# Patient Record
Sex: MALE | Race: BLACK OR AFRICAN AMERICAN | URBAN - METROPOLITAN AREA
[De-identification: names, ages, dates, MRNs, and addresses within clinical notes are randomized per-mention and may not be internally consistent; named-entity substitution may affect disease eponyms.]

---

## 2017-01-06 ENCOUNTER — OFFICE VISIT (OUTPATIENT)
Dept: INTERNAL MEDICINE CLINIC | Facility: CLINIC | Age: 52
End: 2017-01-06

## 2017-01-06 VITALS
HEART RATE: 68 BPM | WEIGHT: 202.5 LBS | SYSTOLIC BLOOD PRESSURE: 118 MMHG | HEIGHT: 70 IN | BODY MASS INDEX: 28.99 KG/M2 | TEMPERATURE: 97.4 F | DIASTOLIC BLOOD PRESSURE: 78 MMHG | RESPIRATION RATE: 18 BRPM

## 2017-01-06 DIAGNOSIS — G47.33 OBSTRUCTIVE SLEEP APNEA SYNDROME: ICD-10-CM

## 2017-01-06 DIAGNOSIS — G43.109 MIGRAINE WITH AURA AND WITHOUT STATUS MIGRAINOSUS, NOT INTRACTABLE: Primary | ICD-10-CM

## 2017-01-06 DIAGNOSIS — Z23 ENCOUNTER FOR IMMUNIZATION: ICD-10-CM

## 2017-01-06 DIAGNOSIS — J30.2 SEASONAL ALLERGIC RHINITIS, UNSPECIFIED ALLERGIC RHINITIS TRIGGER: ICD-10-CM

## 2017-01-06 PROBLEM — H02.401 PTOSIS, RIGHT EYELID: Status: ACTIVE | Noted: 2017-01-06

## 2017-01-06 RX ORDER — FLUTICASONE PROPIONATE 50 MCG
2 SPRAY, SUSPENSION (ML) NASAL DAILY
COMMUNITY

## 2017-01-06 RX ORDER — CETIRIZINE HCL 10 MG
TABLET ORAL
COMMUNITY

## 2017-01-06 RX ORDER — SUMATRIPTAN 100 MG/1
TABLET, FILM COATED ORAL
Qty: 12 TAB | Refills: 5 | Status: SHIPPED | OUTPATIENT
Start: 2017-01-06 | End: 2017-11-07 | Stop reason: SDUPTHER
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
## 2017-01-06 NOTE — PROGRESS NOTES
Chief Complaint   Patient presents with   [facility]    Headache     Judy Lopez  is a 54 y.o.  male  who present for Flu immunizations/injections. He/she denies any symptoms , reactions or allergies that would exclude them from being immunized today. Risks and adverse reactions were discussed and the VIS was given if applicable to them. All questions were addressed. He/She was observed for 5 min post injection. There were no reactions observed.     Alpa Bustillos LPN

## 2017-01-06 NOTE — MR AVS SNAPSHOT
Visit Information Date & Time Provider Department Dept. Phone Encounter #  
 1/6/2017  9:30 AM Chelsea Huffman PA-C [facility] Internal Medicine [phone] [id number] Follow-up Instructions Return in about 6 months (around 7/6/2017) for Migraine follow up. 4-6 weeks if no improvement. Domi Evans Upcoming Health Maintenance Date Due DTaP/Tdap/Td series (1 - Tdap) 4/26/1988 INFLUENZA AGE 9 TO ADULT 8/1/2016 Allergies as of 1/6/2017  Review Complete On: 1/6/2017 By: Chelsea Huffman PA-C Severity Noted Reaction Type Reactions Sulfa (Sulfonamide Antibiotics) Medium 01/26/2012   Side Effect Palpitations Passes out and also have sweating Current Immunizations  Never Reviewed No immunizations on file. Not reviewed this visit You Were Diagnosed With   
  
 Codes Comments Migraine with aura and without status migrainosus, not intractable    -  Primary ICD-10-CM: G43.109 ICD-9-CM: 346.00 Vitals BP Pulse Temp Resp Height(growth percentile) Weight(growth percentile) 118/78 68 97.4 °F (36.3 °C) (Oral) 18 5' 10\" (1.778 m) 202 lb 8 oz (91.9 kg) BMI Smoking Status 29.06 kg/m2 Never Smoker Vitals History BMI and BSA Data Body Mass Index Body Surface Area  
 29.06 kg/m 2 2.13 m 2 Preferred Pharmacy Pharmacy Name Phone [id number] [place], [address] [phone] Your Updated Medication List  
  
   
This list is accurate as of: 1/6/17 10:23 AM.  Always use your most recent med list.  
  
  
  
  
 MOTRIN 800 mg tablet Generic drug:  ibuprofen Take  by mouth every six (6) hours as needed. SUMAtriptan 100 mg tablet Commonly known as:  IMITREX Take once at migraine onset and once 2 hours later if not symptom-free. Prescriptions Printed Refills  SUMAtriptan (IMITREX) 100 mg tablet 5  
 Sig: Take once at migraine onset and once 2 hours later if not symptom-free. Class: Print Follow-up Instructions Return in about 6 months (around 7/6/2017) for Migraine follow up. 4-6 weeks if no improvement. .  
  
  
Introducing [facility] HEALTH SERVICES! [facility] introduces [employer] patient portal. Now you can access parts of your medical record, email your doctor's office, and request medication refills online. 1. In your internet browser, go to [email or web address] 2. Click on the First Time User? Click Here link in the Sign In box. You will see the New Member Sign Up page. 3. Enter your [employer] Access Code exactly as it appears below. You will not need to use this code after youve completed the sign-up process. If you do not sign up before the expiration date, you must request a new code. · [employer] Access Code: [email or web address] Expires: 4/6/2017  9:18 AM 
 
4. Enter the last four digits of your Social Security Number (xxxx) and Date of Birth (mm/dd/yyyy) as indicated and click Submit. You will be taken to the next sign-up page. 5. Create a [employer] ID. This will be your [employer] login ID and cannot be changed, so think of one that is secure and easy to remember. 6. Create a [employer] password. You can change your password at any time. 7. Enter your Password Reset Question and Answer. This can be used at a later time if you forget your password. 8. Enter your e-mail address. You will receive e-mail notification when new information is available in [address] 9. Click Sign Up. You can now view and download portions of your medical record. 10. Click the Download Summary menu link to download a portable copy of your medical information. If you have questions, please visit the Frequently Asked Questions section of the Ind[employer] website. Remember, [employer] is NOT to be used for urgent needs. For medical emergencies, dial 911. Now available from your iPhone and Android! Please provide this summary of care documentation to your next provider. If you have any questions after today's visit, please call [phone].

## 2017-02-02 ENCOUNTER — OFFICE VISIT (OUTPATIENT)
Dept: INTERNAL MEDICINE CLINIC | Facility: CLINIC | Age: 52
End: 2017-02-02

## 2017-02-02 VITALS
SYSTOLIC BLOOD PRESSURE: 132 MMHG | BODY MASS INDEX: 28.49 KG/M2 | OXYGEN SATURATION: 95 % | WEIGHT: 199 LBS | HEART RATE: 97 BPM | HEIGHT: 70 IN | TEMPERATURE: 97.2 F | RESPIRATION RATE: 18 BRPM | DIASTOLIC BLOOD PRESSURE: 86 MMHG

## 2017-02-02 DIAGNOSIS — Z23 ENCOUNTER FOR IMMUNIZATION: ICD-10-CM

## 2017-02-02 DIAGNOSIS — G43.109 MIGRAINE WITH AURA AND WITHOUT STATUS MIGRAINOSUS, NOT INTRACTABLE: Primary | ICD-10-CM

## 2017-02-02 PROBLEM — Z87.828 HISTORY OF EYE TRAUMA: Status: ACTIVE | Noted: 2017-02-02

## 2017-02-02 NOTE — MR AVS SNAPSHOT
Visit Information Date & Time Provider Department Dept. Phone Encounter #  
 2/2/2017  2:00 PM Amanda Liang PA-C [facility] Internal Medicine [phone] [id number] Follow-up Instructions Return in about 6 months (around 8/2/2017) for Migraine follow up. Upcoming Health Maintenance Date Due DTaP/Tdap/Td series (1 - Tdap) 4/26/1988 Allergies as of 2/2/2017  Review Complete On: 2/2/2017 By: Amanda Liang PA-C Severity Noted Reaction Type Reactions Sulfa (Sulfonamide Antibiotics) Medium 01/26/2012   Side Effect Palpitations Passes out and also have sweating Current Immunizations  Never Reviewed Name Date Influenza Vaccine (Quad) PF 1/6/2017 Tdap  Incomplete Not reviewed this visit You Were Diagnosed With   
  
 Codes Comments Migraine with aura and without status migrainosus, not intractable    -  Primary ICD-10-CM: G43.109 ICD-9-CM: 346.00 Encounter for immunization     ICD-10-CM: K69 ICD-9-CM: V03.89 Vitals BP Pulse Temp Resp Height(growth percentile) Weight(growth percentile) 132/86 (BP 1 Location: Left arm, BP Patient Position: Sitting) 97 97.2 °F (36.2 °C) (Oral) 18 5' 10\" (1.778 m) 199 lb (90.3 kg) SpO2 BMI Smoking Status 95% 28.55 kg/m2 Never Smoker Vitals History BMI and BSA Data Body Mass Index Body Surface Area 28.55 kg/m 2 2.11 m 2 Preferred Pharmacy Pharmacy Name Phone [id number] [place], [address] [phone] Your Updated Medication List  
  
   
This list is accurate as of: 2/2/17  2:40 PM.  Always use your most recent med list.  
  
  
  
  
 cetirizine 10 mg tablet Commonly known as:  ZYRTEC Take  by mouth. FLONASE 50 mcg/actuation nasal spray Generic drug:  fluticasone 2 Sprays by Both Nostrils route daily. SUMAtriptan 100 mg tablet Commonly known as:  IMITREX Take once at migraine onset and once 2 hours later if not symptom-free. We Performed the Following TETANUS, DIPHTHERIA TOXOIDS AND ACELLULAR PERTUSSIS VACCINE (TDAP), IN INDIVIDS. >=7, IM [id number] CPT(R)] Follow-up Instructions Return in about 6 months (around 8/2/2017) for Migraine follow up. Introducing [facility] & HEALTH SERVICES! [facility] introduces [employer] patient portal. Now you can access parts of your medical record, email your doctor's office, and request medication refills online. 1. In your internet browser, go to [email or web address] 2. Click on the First Time User? Click Here link in the Sign In box. You will see the New Member Sign Up page. 3. Enter your Seg[employer] Access Code exactly as it appears below. You will not need to use this code after youve completed the sign-up process. If you do not sign up before the expiration date, you must request a new code. · [employer] Access Code: [email or web address] Expires: 4/6/2017  9:18 AM 
 
4. Enter the last four digits of your Social Security Number (xxxx) and Date of Birth (mm/dd/yyyy) as indicated and click Submit. You will be taken to the next sign-up page. 5. Create a Seguricel ID. This will be your [employer] login ID and cannot be changed, so think of one that is secure and easy to remember. 6. Create a Seguricel password. You can change your password at any time. 7. Enter your Password Reset Question and Answer. This can be used at a later time if you forget your password. 8. Enter your e-mail address. You will receive e-mail notification when new information is available in [address] 9. Click Sign Up. You can now view and download portions of your medical record. 10. Click the Download Summary menu link to download a portable copy of your medical information.  
 
If you have questions, please visit the Frequently Asked Questions section of the Perfecto Mobile. Remember, [employer] is NOT to be used for urgent needs. For medical emergencies, dial 911. Now available from your iPhone and Android! Please provide this summary of care documentation to your next provider. If you have any questions after today's visit, please call [phone].

## 2017-02-02 NOTE — PROGRESS NOTES
Room 7    Chief Complaint   Patient presents with   [address] [facility] documentation     1. Have you been to the ER, urgent care clinic since your last visit? Hospitalized since your last visit? No    2. Have you seen or consulted any other health care providers outside of the [address] [place] [address] since your last visit? Include any pap smears or colon screening. No    Health Maintenance Due   Topic Date Due    DTaP/Tdap/Td series (1 - Tdap) 04/26/1988     Advance directives reviewed.  Patient received information previously

## 2017-02-02 NOTE — PROGRESS NOTES
HISTORY OF PRESENT ILLNESS  Preeti Bearden is a 54 y.o. male. HPI  1) Migraine Headaches s/p R eye trauma 2004. Tried Imitrex for the first time yesterday - didn't have to take a second dose. The first dose made pt feel sleepy, he took a nap, and awoke without a headache! [facility]. Review of Systems   Constitutional: Negative for fever. HENT: Negative for hearing loss. Eyes: Positive for blurred vision and photophobia. Neurological: Positive for headaches. Physical Exam   Constitutional: He is oriented to person, place, and time. He appears well-developed and well-nourished. No distress. HENT:   Head: Normocephalic and atraumatic. Neck: Neck supple. No JVD present. Cardiovascular: Normal rate, regular rhythm and normal heart sounds. Pulmonary/Chest: Effort normal and breath sounds normal. No respiratory distress. Musculoskeletal: He exhibits no edema. Neurological: He is alert and oriented to person, place, and time. Skin: Skin is warm and dry. Psychiatric: He has a normal mood and affect. His behavior is normal. Judgment and thought content normal.   Nursing note and vitals reviewed. ASSESSMENT and PLAN    ICD-10-CM ICD-9-CM    1. Migraine with aura and without status migrainosus, not intractable G43.109 346.00 Continue Imitrex PRN. [facility] completed [facility]. Original given to patient. Copy made for our records.     2. Encounter for immunization Z23 V03.89 TETANUS, DIPHTHERIA TOXOIDS AND ACELLULAR PERTUSSIS VACCINE (TDAP), IN INDIVIDS. >=7, IM

## 2017-02-07 ENCOUNTER — DOCUMENTATION ONLY (OUTPATIENT)
Dept: INTERNAL MEDICINE CLINIC | Facility: CLINIC | Age: 52
End: 2017-02-07

## 2017-03-03 PROBLEM — H40.10X0 OPEN-ANGLE GLAUCOMA: Status: ACTIVE | Noted: 2017-03-03

## 2017-04-06 ENCOUNTER — OFFICE VISIT (OUTPATIENT)
Dept: INTERNAL MEDICINE CLINIC | Facility: CLINIC | Age: 52
End: 2017-04-06

## 2017-04-06 VITALS
RESPIRATION RATE: 18 BRPM | WEIGHT: 207 LBS | TEMPERATURE: 98.3 F | HEIGHT: 70 IN | BODY MASS INDEX: 29.63 KG/M2 | SYSTOLIC BLOOD PRESSURE: 108 MMHG | OXYGEN SATURATION: 96 % | DIASTOLIC BLOOD PRESSURE: 68 MMHG | HEART RATE: 86 BPM

## 2017-04-06 DIAGNOSIS — H00.012 HORDEOLUM EXTERNUM OF RIGHT LOWER EYELID: Primary | ICD-10-CM

## 2017-04-06 RX ORDER — BACITRACIN ZINC 500 UNIT/G
OINTMENT (GRAM) TOPICAL
Qty: 28 G | Refills: 0
Start: 2017-04-06 | End: 2017-04-13

## 2017-04-06 RX ORDER — ERYTHROMYCIN 5 MG/G
0.1 OINTMENT OPHTHALMIC EVERY 6 HOURS
Qty: 3.5 G | Refills: 1 | Status: SHIPPED | OUTPATIENT
Start: 2017-04-06 | End: 2017-04-06 | Stop reason: RX
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
## 2017-04-06 NOTE — MR AVS SNAPSHOT
Visit Information Date & Time Provider Department Dept. Phone Encounter #  
 4/6/2017  2:45 PM Pedro Quick PA-C [facility] Internal Medicine [phone] [id number] Follow-up Instructions Return if symptoms worsen or fail to improve. Upcoming Health Maintenance Date Due DTaP/Tdap/Td series (3 - Td) 2/2/2027 Allergies as of 4/6/2017  Review Complete On: 4/6/2017 By: Eleonora Jeffries LPN Severity Noted Reaction Type Reactions Sulfa (Sulfonamide Antibiotics) Medium 01/26/2012   Side Effect Palpitations Passes out and also have sweating Current Immunizations  Reviewed on 2/7/2017 Name Date Hep B Vaccine 6/8/2009, 2/6/2009 Influenza Vaccine (Quad) PF 1/6/2017 Novel Influenza-H1N1-09, Injectable 11/17/2009 Td 1/1/2001 Tdap 2/2/2017, 2/12/2014 Not reviewed this visit You Were Diagnosed With   
  
 Codes Comments Hordeolum externum of right lower eyelid    -  Primary ICD-10-CM: H00.012 ICD-9-CM: 373.11 Vitals BP Pulse Temp Resp Height(growth percentile) Weight(growth percentile) (!) 134/91 (BP 1 Location: Left arm, BP Patient Position: Sitting) 86 98.3 °F (36.8 °C) (Oral) 18 5' 10\" (1.778 m) 207 lb (93.9 kg) SpO2 BMI Smoking Status 96% 29.7 kg/m2 Never Smoker Vitals History BMI and BSA Data Body Mass Index Body Surface Area  
 29.7 kg/m 2 2.15 m 2 Preferred Pharmacy Pharmacy Name Phone [id number] [place], [address] [phone] Your Updated Medication List  
  
   
This list is accurate as of: 4/6/17  3:19 PM.  Always use your most recent med list.  
  
  
  
  
 cetirizine 10 mg tablet Commonly known as:  ZYRTEC Take  by mouth. erythromycin ophthalmic ointment Commonly known as:  ILOTYCIN Administer 0.1 g to both eyes every six (6) hours. FLONASE 50 mcg/actuation nasal spray Generic drug:  fluticasone 2 Sprays by Both Nostrils route daily. SUMAtriptan 100 mg tablet Commonly known as:  IMITREX Take once at migraine onset and once 2 hours later if not symptom-free. Prescriptions Sent to Pharmacy Refills  
 erythromycin (ILOTYCIN) ophthalmic ointment 1 Sig: Administer 0.1 g to both eyes every six (6) hours. Class: Normal  
 Pharmacy: [facility], [address]  #: [phone] Route: Both Eyes Follow-up Instructions Return if symptoms worsen or fail to improve. Introducing [facility] & HEALTH SERVICES! [facility] introduces [employer] patient portal. Now you can access parts of your medical record, email your doctor's office, and request medication refills online. 1. In your internet browser, go to [email or web address] 2. Click on the First Time User? Click Here link in the Sign In box. You will see the New Member Sign Up page. 3. Enter your [employer] Access Code exactly as it appears below. You will not need to use this code after youve completed the sign-up process. If you do not sign up before the expiration date, you must request a new code. · [employer] Access Code: [email or web address] Expires: 7/5/2017  3:18 PM 
 
4. Enter the last four digits of your Social Security Number (xxxx) and Date of Birth (mm/dd/yyyy) as indicated and click Submit. You will be taken to the next sign-up page. 5. Create a Twitt2got ID. This will be your [employer] login ID and cannot be changed, so think of one that is secure and easy to remember. 6. Create a Twitt2got password. You can change your password at any time. 7. Enter your Password Reset Question and Answer. This can be used at a later time if you forget your password. 8. Enter your e-mail address.  You will receive e-mail notification when new information is available in [employer]. 9. Click Sign Up. You can now view and download portions of your medical record. 10. Click the Download Summary menu link to download a portable copy of your medical information. If you have questions, please visit the Frequently Asked Questions section of the [employer] website. Remember, [employer] is NOT to be used for urgent needs. For medical emergencies, dial 911. Now available from your iPhone and Android! Please provide this summary of care documentation to your next provider. If you have any questions after today's visit, please call [phone].
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
## 2017-11-07 ENCOUNTER — OFFICE VISIT (OUTPATIENT)
Dept: INTERNAL MEDICINE CLINIC | Facility: CLINIC | Age: 52
End: 2017-11-07

## 2017-11-07 ENCOUNTER — TELEPHONE (OUTPATIENT)
Dept: INTERNAL MEDICINE CLINIC | Facility: CLINIC | Age: 52
End: 2017-11-07

## 2017-11-07 VITALS
HEIGHT: 70 IN | WEIGHT: 199.2 LBS | BODY MASS INDEX: 28.52 KG/M2 | SYSTOLIC BLOOD PRESSURE: 112 MMHG | HEART RATE: 71 BPM | TEMPERATURE: 98.5 F | RESPIRATION RATE: 18 BRPM | DIASTOLIC BLOOD PRESSURE: 78 MMHG

## 2017-11-07 DIAGNOSIS — G47.33 OBSTRUCTIVE SLEEP APNEA SYNDROME: ICD-10-CM

## 2017-11-07 DIAGNOSIS — Z00.00 ANNUAL PHYSICAL EXAM: Primary | ICD-10-CM

## 2017-11-07 DIAGNOSIS — Z23 ENCOUNTER FOR IMMUNIZATION: ICD-10-CM

## 2017-11-07 DIAGNOSIS — G43.109 MIGRAINE WITH AURA AND WITHOUT STATUS MIGRAINOSUS, NOT INTRACTABLE: ICD-10-CM

## 2017-11-07 RX ORDER — SUMATRIPTAN 100 MG/1
TABLET, FILM COATED ORAL
Qty: 12 TAB | Refills: 5 | Status: SHIPPED | OUTPATIENT
Start: 2017-11-07 | End: 2019-03-04 | Stop reason: SDUPTHER

## 2017-11-07 RX ORDER — ALLOPURINOL 300 MG/1
150 TABLET ORAL DAILY
COMMUNITY
End: 2020-01-31 | Stop reason: SDUPTHER

## 2017-11-07 RX ORDER — GUAIFENESIN 100 MG/5ML
81 LIQUID (ML) ORAL DAILY
COMMUNITY

## 2017-11-07 NOTE — MR AVS SNAPSHOT
Visit Information Date & Time Provider Department Dept. Phone Encounter #  
 11/7/2017 12:45 PM Gage Langston PA-C [facility] Internal Medicine [phone] [id number] Follow-up Instructions Return in about 6 months (around 5/7/2018) for 30 minute migraine/HIPOLITO/weight follow up. Upcoming Health Maintenance Date Due FOBT Q 1 YEAR AGE 50-75 4/26/2017 Influenza Age 5 to Adult 8/1/2017 DTaP/Tdap/Td series (3 - Td) 2/2/2027 Allergies as of 11/7/2017  Review Complete On: 11/7/2017 By: Rona Tamez LPN Severity Noted Reaction Type Reactions Sulfa (Sulfonamide Antibiotics) Medium 01/26/2012   Side Effect Palpitations Passes out and also have sweating Current Immunizations  Reviewed on 2/7/2017 Name Date Hep B Vaccine 6/8/2009, 2/6/2009 Influenza Vaccine (Quad) PF  Incomplete, 1/6/2017 Novel Influenza-H1N1-09, Injectable 11/17/2009 Td 1/1/2001 Tdap 2/2/2017, 2/12/2014 Not reviewed this visit You Were Diagnosed With   
  
 Codes Comments Annual physical exam    -  Primary ICD-10-CM: Z00.00 ICD-9-CM: V70.0 Migraine with aura and without status migrainosus, not intractable     ICD-10-CM: G43.109 ICD-9-CM: 346.00 Obstructive sleep apnea syndrome     ICD-10-CM: G47.33 
ICD-9-CM: 327.23 Encounter for immunization     ICD-10-CM: Y53 ICD-9-CM: V03.89 Vitals BP Pulse Temp Resp Height(growth percentile) Weight(growth percentile) 112/78 71 98.5 °F (36.9 °C) (Oral) 18 5' 10\" (1.778 m) 199 lb 3.2 oz (90.4 kg) BMI Smoking Status 28.58 kg/m2 Never Smoker Vitals History BMI and BSA Data Body Mass Index Body Surface Area 28.58 kg/m 2 2.11 m 2 Preferred Pharmacy Pharmacy Name Phone [id number] [place], [address] [phone] Your Updated Medication List  
  
   
 This list is accurate as of: 11/7/17  1:25 PM.  Always use your most recent med list.  
  
  
  
  
 allopurinol 300 mg tablet Commonly known as:  Chelo Casa Take 150 mg by mouth daily. Indications: takes half a dose  
  
 aspirin 81 mg chewable tablet Take 81 mg by mouth daily. cetirizine 10 mg tablet Commonly known as:  ZYRTEC Take  by mouth. FLONASE 50 mcg/actuation nasal spray Generic drug:  fluticasone 2 Sprays by Both Nostrils route daily. SUMAtriptan 100 mg tablet Commonly known as:  IMITREX Take once at migraine onset and once 2 hours later if not symptom-free. Prescriptions Sent to Pharmacy Refills SUMAtriptan (IMITREX) 100 mg tablet 5 Sig: Take once at migraine onset and once 2 hours later if not symptom-free. Class: Normal  
 Pharmacy: [address] [place] [address], [address]  #: [phone] We Performed the Following CBC WITH AUTOMATED DIFF [20235 CPT(R)] HEMOGLOBIN A1C W/O EAG [71073 CPT(R)] INFLUENZA VIRUS VAC QUAD,SPLIT,PRESV FREE SYRINGE IM V1057810 CPT(R)] LIPID PANEL [76941 CPT(R)] METABOLIC PANEL, COMPREHENSIVE [46875 CPT(R)] TSH REFLEX TO T4 [[id number] Custom] VITAMIN D, 1, 25 DIHYDROXY [67926 CPT(R)] Follow-up Instructions Return in about 6 months (around 5/7/2018) for 30 minute migraine/HIPOLITO/weight follow up. Introducing [facility]! Kathleen Voss introduces [employer] patient portal. Now you can access parts of your medical record, email your doctor's office, and request medication refills online. 1. In your internet browser, go to [email or web address] 2. Click on the First Time User? Click Here link in the Sign In box. You will see the New Member Sign Up page. 3. Enter your [employer] Access Code exactly as it appears below.  You will not need to use this code after youve completed the sign-up process. If you do not sign up before the expiration date, you must request a new code. · [employer]akes[employer] Access Code: [email or web address] Expires: 2/5/2018 12:40 PM 
 
4. Enter the last four digits of your Social Security Number (xxxx) and Date of Birth (mm/dd/yyyy) as indicated and click Submit. You will be taken to the next sign-up page. 5. Create a [employer]akes[employer] ID. This will be your [employer] login ID and cannot be changed, so think of one that is secure and easy to remember. 6. Create a [employer] password. You can change your password at any time. 7. Enter your Password Reset Question and Answer. This can be used at a later time if you forget your password. 8. Enter your e-mail address. You will receive e-mail notification when new information is available in [address] 9. Click Sign Up. You can now view and download portions of your medical record. 10. Click the Download Summary menu link to download a portable copy of your medical information. If you have questions, please visit the Frequently Asked Questions section of the [employer] website. Remember, [employer] is NOT to be used for urgent needs. For medical emergencies, dial 911. Now available from your iPhone and Android! Please provide this summary of care documentation to your next provider. If you have any questions after today's visit, please call [phone].

## 2017-11-07 NOTE — PROGRESS NOTES
HISTORY OF PRESENT ILLNESS  Amie Lopez is a 50 y.o. male. Chief Complaint   Patient presents with    Physical     room 7     HPI  1) CE - fasting for labs. 2) [facility] a [facility]. Health Maintenance Due   Topic Date Due    FOBT Q 1 YEAR AGE 50-75  04/26/2017    Influenza Age 5 to Adult  08/01/2017     3) Overweight - Pt has lost 8 lbs in the past 7 months! Working out regularly. Lowering calorie count and trying to avoid red meat. Wt Readings from Last 3 Encounters:   11/07/17 199 lb 3.2 oz (90.4 kg)   04/06/17 207 lb (93.9 kg)   02/02/17 199 lb (90.3 kg)     4) HIPOLITO - not using CPAP with sinusitis/Allergy issues. Thinking of getting alternative with sleep med/pharmacist to try to do that. 5) Migraine - keeping migraine diary. Imitrex is helping. Needs letter for work completed. Review of Systems   Constitutional: Negative for fever and weight loss. HENT: Negative for congestion, hearing loss and sore throat. Eyes: Negative for blurred vision. Respiratory: Negative for cough and shortness of breath. Cardiovascular: Negative for chest pain, palpitations and leg swelling. Gastrointestinal: Negative for abdominal pain, blood in stool, constipation, diarrhea, heartburn, melena, nausea and vomiting. Genitourinary: Negative for dysuria, frequency, hematuria and urgency. Musculoskeletal: Negative for back pain, joint pain and neck pain. Neurological: Negative for dizziness, seizures, loss of consciousness, weakness and headaches. Endo/Heme/Allergies: Negative for environmental allergies. Psychiatric/Behavioral: Negative for depression and substance abuse. The patient is not nervous/anxious and does not have insomnia. Physical Exam   Constitutional: He is oriented to person, place, and time. He appears well-developed and well-nourished. No distress. HENT:   Head: Normocephalic and atraumatic.    Right Ear: External ear normal.   Left Ear: External ear normal.   Nose: Nose normal.   Mouth/Throat: Oropharynx is clear and moist.   Eyes: Conjunctivae are normal.   Neck: Neck supple. No JVD present. No thyromegaly present. Cardiovascular: Normal rate, regular rhythm and normal heart sounds. Pulmonary/Chest: Effort normal and breath sounds normal. No respiratory distress. Abdominal: Soft. Bowel sounds are normal. He exhibits no distension and no mass. There is no tenderness. There is no rebound and no guarding. Genitourinary: No penile tenderness. Genitourinary Comments: Scar tissue noted L distal penis. Pt notes no change since childhood. Bilateral testicles nontender and without mass/nodules. No hernia. Musculoskeletal: He exhibits no edema. Lymphadenopathy:     He has no cervical adenopathy. Neurological: He is alert and oriented to person, place, and time. Skin: Skin is warm and dry. Psychiatric: He has a normal mood and affect. His behavior is normal. Judgment and thought content normal.   Nursing note and vitals reviewed. ASSESSMENT and PLAN    ICD-10-CM ICD-9-CM    1. Annual physical exam Z00.00 V70.0 CBC WITH AUTOMATED DIFF      METABOLIC PANEL, COMPREHENSIVE      HEMOGLOBIN A1C W/O EAG      LIPID PANEL      TSH REFLEX TO T4      VITAMIN D, 1, 25 DIHYDROXY   2. Migraine with aura and without status migrainosus, not intractable G43.109 346.00 SUMAtriptan (IMITREX) 100 mg tablet refill sent. 3. Obstructive sleep apnea syndrome G47.33 327.23 Encouraged compliance. Discussed risks of not treating sleep apnea including stroke.     4. Encounter for immunization Z23 V03.89 INFLUENZA VIRUS VAC QUAD,SPLIT,PRESV FREE SYRINGE IM

## 2017-11-07 NOTE — LETTER
2017 2:42 PM 
 
Mr. Juan Miguel Wilson [id number] [facility] [address] [id number] Subject: Medical history of Juan Miguel Wilson : 1965 To Whom It May Concern, 
 
Juan Miguel Wilson has been my patient since 17 and I have reviewed his extensive past medical records. Mr. Augustine Spencer has no history of migraines or chronic headaches prior to eye trauma 04. He first presented with migraines on 04. After examining Mr. Augustine Spencer, and reviewing his past medical records, it is my opinion that it is more likely than not that Mr. Mathurs migraines and chronic headaches are directly related to the eye injury he suffered in [employer] [facility]. Sincerely, Kale Iyer PA-C

## 2017-11-07 NOTE — PROGRESS NOTES
Chief Complaint   Patient presents with    Physical     1. Have you been to the ER, urgent care clinic since your last visit? Hospitalized since your last visit? No    2. Have you seen or consulted any other health care providers outside of the [address] [place] [address] since your last visit? Include any pap smears or colon screening. Clara Leigh  is a 50 y.o.  male  who present for influenza immunizations/injections. He/she denies any symptoms , reactions or allergies that would exclude them from being immunized today. Risks and adverse reactions were discussed and the VIS was given if applicable to them. All questions were addressed. He/She was observed for 5 min post injection. There were no reactions observed.     Huan Pencil, LPN

## 2017-11-08 LAB
1,25(OH)2D3 SERPL-MCNC: 38.5 PG/ML (ref 19.9–79.3)
ALBUMIN SERPL-MCNC: 4.6 G/DL (ref 3.5–5.5)
ALBUMIN/GLOB SERPL: 1.8 {RATIO} (ref 1.2–2.2)
ALP SERPL-CCNC: 60 IU/L (ref 39–117)
ALT SERPL-CCNC: 16 IU/L (ref 0–44)
AST SERPL-CCNC: 21 IU/L (ref 0–40)
BASOPHILS # BLD AUTO: 0 X10E3/UL (ref 0–0.2)
BASOPHILS NFR BLD AUTO: 0 %
BILIRUB SERPL-MCNC: 1.3 MG/DL (ref 0–1.2)
BUN SERPL-MCNC: 14 MG/DL (ref 6–24)
BUN/CREAT SERPL: 11 (ref 9–20)
CALCIUM SERPL-MCNC: 9.3 MG/DL (ref 8.7–10.2)
CHLORIDE SERPL-SCNC: 100 MMOL/L (ref 96–106)
CHOLEST SERPL-MCNC: 201 MG/DL (ref 100–199)
CO2 SERPL-SCNC: 23 MMOL/L (ref 18–29)
CREAT SERPL-MCNC: 1.27 MG/DL (ref 0.76–1.27)
EOSINOPHIL # BLD AUTO: 0.1 X10E3/UL (ref 0–0.4)
EOSINOPHIL NFR BLD AUTO: 2 %
ERYTHROCYTE [DISTWIDTH] IN BLOOD BY AUTOMATED COUNT: 14.3 % (ref 12.3–15.4)
GFR SERPLBLD CREATININE-BSD FMLA CKD-EPI: 65 ML/MIN/1.73
GFR SERPLBLD CREATININE-BSD FMLA CKD-EPI: 76 ML/MIN/1.73
GLOBULIN SER CALC-MCNC: 2.5 G/DL (ref 1.5–4.5)
GLUCOSE SERPL-MCNC: 82 MG/DL (ref 65–99)
HBA1C MFR BLD: 5.5 % (ref 4.8–5.6)
HCT VFR BLD AUTO: 42.1 % (ref 37.5–51)
HDLC SERPL-MCNC: 45 MG/DL
HGB BLD-MCNC: 14.1 G/DL (ref 12.6–17.7)
IMM GRANULOCYTES # BLD: 0 X10E3/UL (ref 0–0.1)
IMM GRANULOCYTES NFR BLD: 0 %
LDLC SERPL CALC-MCNC: 132 MG/DL (ref 0–99)
LYMPHOCYTES # BLD AUTO: 2.2 X10E3/UL (ref 0.7–3.1)
LYMPHOCYTES NFR BLD AUTO: 33 %
MCH RBC QN AUTO: 29.4 PG (ref 26.6–33)
MCHC RBC AUTO-ENTMCNC: 33.5 G/DL (ref 31.5–35.7)
MCV RBC AUTO: 88 FL (ref 79–97)
MONOCYTES # BLD AUTO: 0.6 X10E3/UL (ref 0.1–0.9)
MONOCYTES NFR BLD AUTO: 9 %
NEUTROPHILS # BLD AUTO: 3.7 X10E3/UL (ref 1.4–7)
NEUTROPHILS NFR BLD AUTO: 56 %
PLATELET # BLD AUTO: 258 X10E3/UL (ref 150–379)
POTASSIUM SERPL-SCNC: 4.1 MMOL/L (ref 3.5–5.2)
PROT SERPL-MCNC: 7.1 G/DL (ref 6–8.5)
RBC # BLD AUTO: 4.79 X10E6/UL (ref 4.14–5.8)
SODIUM SERPL-SCNC: 141 MMOL/L (ref 134–144)
TRIGL SERPL-MCNC: 121 MG/DL (ref 0–149)
TSH SERPL DL<=0.005 MIU/L-ACNC: 2.16 UIU/ML (ref 0.45–4.5)
VLDLC SERPL CALC-MCNC: 24 MG/DL (ref 5–40)
WBC # BLD AUTO: 6.7 X10E3/UL (ref 3.4–10.8)

## 2017-11-09 PROBLEM — E78.00 HYPERCHOLESTEREMIA: Status: ACTIVE | Noted: 2017-11-09

## 2019-02-12 ENCOUNTER — OFFICE VISIT (OUTPATIENT)
Dept: INTERNAL MEDICINE CLINIC | Age: 54
End: 2019-02-12

## 2019-02-12 VITALS
DIASTOLIC BLOOD PRESSURE: 88 MMHG | BODY MASS INDEX: 28.63 KG/M2 | SYSTOLIC BLOOD PRESSURE: 134 MMHG | RESPIRATION RATE: 16 BRPM | OXYGEN SATURATION: 98 % | WEIGHT: 200 LBS | HEART RATE: 58 BPM | TEMPERATURE: 97.9 F | HEIGHT: 70 IN

## 2019-02-12 DIAGNOSIS — Z23 ENCOUNTER FOR IMMUNIZATION: ICD-10-CM

## 2019-02-12 DIAGNOSIS — Z00.00 ANNUAL PHYSICAL EXAM: Primary | ICD-10-CM

## 2019-02-12 NOTE — PROGRESS NOTES
1. Have you been to the ER, urgent care clinic since your last visit? Hospitalized since your last visit? No 
 
2. Have you seen or consulted any other health care providers outside of the [address] [place] [address] since your last visit? Include any pap smears or colon screening. Yes Chief Complaint Patient presents with  Complete Physical  
 
Fasting

## 2019-02-12 NOTE — PROGRESS NOTES
Analia Pena is a 48 y.o. male Chief Complaint Patient presents with  Complete Physical  
  
 
Health Maintenance Due Topic Date Due  Shingrix Vaccine Age 50> (1 of 2) 04/26/2017  Influenza Age 5 to Adult  08/01/2018 Influenza: up to date at work. HPI 
 
CE today - would like fasting lab order Would like T checked due to fatigue, decreased libido. Getting styes R lower eyelid 1-2x/month. Waking up with eye discharge. Going to see eye doctor soon. Review of Systems Constitutional: Negative for fever and weight loss. HENT: Negative for congestion, hearing loss and sore throat. Eyes: Negative for blurred vision. Respiratory: Negative for cough and shortness of breath. Cardiovascular: Negative for chest pain, palpitations and leg swelling. Gastrointestinal: Negative for abdominal pain, blood in stool, constipation, diarrhea, heartburn, melena, nausea and vomiting. Genitourinary: Negative for dysuria, frequency, hematuria and urgency. Musculoskeletal: Negative for back pain, joint pain and neck pain. Neurological: Negative for dizziness, seizures, loss of consciousness, weakness and headaches. Endo/Heme/Allergies: Negative for environmental allergies. Psychiatric/Behavioral: Negative for depression and substance abuse. The patient is not nervous/anxious and does not have insomnia. Physical Exam  
Constitutional: He is oriented to person, place, and time. He appears well-developed and well-nourished. No distress. HENT:  
Head: Normocephalic and atraumatic. Right Ear: External ear normal.  
Left Ear: External ear normal.  
Nose: Nose normal.  
Mouth/Throat: Oropharynx is clear and moist.  
Eyes: Conjunctivae are normal.  
Neck: Neck supple. No JVD present. No thyromegaly present. Cardiovascular: Normal rate, regular rhythm and normal heart sounds. Pulmonary/Chest: Effort normal and breath sounds normal. No respiratory distress. Abdominal: Soft. Bowel sounds are normal. He exhibits no distension and no mass. There is no tenderness. There is no rebound and no guarding. Musculoskeletal: He exhibits no edema. Lymphadenopathy:  
  He has no cervical adenopathy. Neurological: He is alert and oriented to person, place, and time. Skin: Skin is warm and dry. Psychiatric: He has a normal mood and affect. His behavior is normal. Judgment and thought content normal.  
Nursing note and vitals reviewed. Diagnoses and all orders for this visit: 1. Encounter for immunization 
-     varicella-zoster recombinant, PF, (SHINGRIX, PF,) 50 mcg/0.5 mL susr injection; 0.5 mL by IntraMUSCular route once for 1 dose. Please administer once every 6 months for two doses and fax record. Thanks! 2. Annual physical exam 
-     CBC WITH AUTOMATED DIFF 
-     METABOLIC PANEL, COMPREHENSIVE 
-     HEMOGLOBIN A1C W/O EAG 
-     LIPID PANEL 
-     TSH RFX ON ABNORMAL TO FREE T4 
-     TESTOSTERONE, TOTAL, ADULT MALE 
-     URIC ACID 
-     PSA SCREENING (SCREENING) 
-     CT/NG/T.VAGINALIS AMPLIFICATION Encouraged pt to schedule appointment with eye doctor re: recurrent styes. Pt agrees.

## 2019-02-13 LAB
ALBUMIN SERPL-MCNC: 4.7 G/DL (ref 3.5–5.5)
ALBUMIN/GLOB SERPL: 1.9 {RATIO} (ref 1.2–2.2)
ALP SERPL-CCNC: 57 IU/L (ref 39–117)
ALT SERPL-CCNC: 16 IU/L (ref 0–44)
AST SERPL-CCNC: 23 IU/L (ref 0–40)
BASOPHILS # BLD AUTO: 0 X10E3/UL (ref 0–0.2)
BASOPHILS NFR BLD AUTO: 1 %
BILIRUB SERPL-MCNC: 1.1 MG/DL (ref 0–1.2)
BUN SERPL-MCNC: 14 MG/DL (ref 6–24)
BUN/CREAT SERPL: 10 (ref 9–20)
CALCIUM SERPL-MCNC: 9.3 MG/DL (ref 8.7–10.2)
CHLORIDE SERPL-SCNC: 99 MMOL/L (ref 96–106)
CHOLEST SERPL-MCNC: 199 MG/DL (ref 100–199)
CO2 SERPL-SCNC: 25 MMOL/L (ref 20–29)
CREAT SERPL-MCNC: 1.35 MG/DL (ref 0.76–1.27)
EOSINOPHIL # BLD AUTO: 0.1 X10E3/UL (ref 0–0.4)
EOSINOPHIL NFR BLD AUTO: 2 %
ERYTHROCYTE [DISTWIDTH] IN BLOOD BY AUTOMATED COUNT: 14.1 % (ref 12.3–15.4)
GLOBULIN SER CALC-MCNC: 2.5 G/DL (ref 1.5–4.5)
GLUCOSE SERPL-MCNC: 93 MG/DL (ref 65–99)
HBA1C MFR BLD: 5.6 % (ref 4.8–5.6)
HCT VFR BLD AUTO: 44.4 % (ref 37.5–51)
HDLC SERPL-MCNC: 45 MG/DL
HGB BLD-MCNC: 15.1 G/DL (ref 13–17.7)
IMM GRANULOCYTES # BLD AUTO: 0 X10E3/UL (ref 0–0.1)
IMM GRANULOCYTES NFR BLD AUTO: 0 %
INTERPRETATION, 910389: NORMAL
LDLC SERPL CALC-MCNC: 129 MG/DL (ref 0–99)
LYMPHOCYTES # BLD AUTO: 2 X10E3/UL (ref 0.7–3.1)
LYMPHOCYTES NFR BLD AUTO: 33 %
MCH RBC QN AUTO: 29.7 PG (ref 26.6–33)
MCHC RBC AUTO-ENTMCNC: 34 G/DL (ref 31.5–35.7)
MCV RBC AUTO: 87 FL (ref 79–97)
MONOCYTES # BLD AUTO: 0.5 X10E3/UL (ref 0.1–0.9)
MONOCYTES NFR BLD AUTO: 9 %
NEUTROPHILS # BLD AUTO: 3.4 X10E3/UL (ref 1.4–7)
NEUTROPHILS NFR BLD AUTO: 55 %
PLATELET # BLD AUTO: 234 X10E3/UL (ref 150–379)
POTASSIUM SERPL-SCNC: 4.3 MMOL/L (ref 3.5–5.2)
PROT SERPL-MCNC: 7.2 G/DL (ref 6–8.5)
PSA SERPL-MCNC: 0.7 NG/ML (ref 0–4)
RBC # BLD AUTO: 5.08 X10E6/UL (ref 4.14–5.8)
SODIUM SERPL-SCNC: 140 MMOL/L (ref 134–144)
TESTOST SERPL-MCNC: 490 NG/DL (ref 264–916)
TRIGL SERPL-MCNC: 123 MG/DL (ref 0–149)
TSH SERPL DL<=0.005 MIU/L-ACNC: 2.58 UIU/ML (ref 0.45–4.5)
URATE SERPL-MCNC: 8.2 MG/DL (ref 3.7–8.6)
VLDLC SERPL CALC-MCNC: 25 MG/DL (ref 5–40)
WBC # BLD AUTO: 6 X10E3/UL (ref 3.4–10.8)

## 2019-02-15 LAB
C TRACH RRNA SPEC QL NAA+PROBE: NEGATIVE
N GONORRHOEA RRNA SPEC QL NAA+PROBE: NEGATIVE
T VAGINALIS RRNA VAG QL NAA+PROBE: NEGATIVE

## 2019-02-18 DIAGNOSIS — R79.89 ELEVATED SERUM CREATININE: Primary | ICD-10-CM

## 2019-03-04 ENCOUNTER — OFFICE VISIT (OUTPATIENT)
Dept: INTERNAL MEDICINE CLINIC | Age: 54
End: 2019-03-04

## 2019-03-04 VITALS
BODY MASS INDEX: 28.2 KG/M2 | TEMPERATURE: 97.6 F | OXYGEN SATURATION: 97 % | HEART RATE: 70 BPM | RESPIRATION RATE: 18 BRPM | WEIGHT: 197 LBS | DIASTOLIC BLOOD PRESSURE: 78 MMHG | SYSTOLIC BLOOD PRESSURE: 120 MMHG | HEIGHT: 70 IN

## 2019-03-04 DIAGNOSIS — E78.00 HYPERCHOLESTEREMIA: ICD-10-CM

## 2019-03-04 DIAGNOSIS — G43.109 MIGRAINE WITH AURA AND WITHOUT STATUS MIGRAINOSUS, NOT INTRACTABLE: Primary | ICD-10-CM

## 2019-03-04 DIAGNOSIS — R79.89 ELEVATED SERUM CREATININE: ICD-10-CM

## 2019-03-04 DIAGNOSIS — H00.012 HORDEOLUM EXTERNUM OF RIGHT LOWER EYELID: ICD-10-CM

## 2019-03-04 RX ORDER — SUMATRIPTAN 100 MG/1
TABLET, FILM COATED ORAL
Qty: 12 TAB | Refills: 5 | Status: SHIPPED | OUTPATIENT
Start: 2019-03-04

## 2019-03-04 RX ORDER — ERYTHROMYCIN 5 MG/G
OINTMENT OPHTHALMIC
Qty: 1 TUBE | Refills: 1 | Status: SHIPPED | OUTPATIENT
Start: 2019-03-04 | End: 2019-04-29

## 2019-03-04 NOTE — PROGRESS NOTES
1. Have you been to the ER, urgent care clinic since your last visit? Hospitalized since your last visit? No    2. Have you seen or consulted any other health care providers outside of the [address] [place] [address] since your last visit? Include any pap smears or colon screening.  Yes    Chief Complaint   Patient presents with    Migraine     follow up

## 2019-03-04 NOTE — PROGRESS NOTES
Kaveh Zepeda is a 48 y.o. male  Chief Complaint   Patient presents with    Migraine     follow up        Health Maintenance Due   Topic Date Due    Shingrix Vaccine Age 49> (1 of 2) 04/26/2017     HPI  Migraine follow up - getting migraines since trauma in [employer] while on duty 4/12/04 - having migraines now 2x/month. Dark screens in home office and decreased travel has helped. Aura - vision changes \"like white worms in my vision\" simultaneously with pain. Pain peaks 30 minutes-1 hour after migraine starts. Treating migraines by taking Imitrex 100 mg, decreasing light and avoiding screens, resting helps. Always needs second dose of Imitrex 2 hours later. Each migraine requires 6-8 hours of resting and treatment. Needs to  letter with clarification of symptoms/duration next week. Wt Readings from Last 3 Encounters:   03/04/19 197 lb (89.4 kg)   02/12/19 200 lb (90.7 kg)   11/07/17 199 lb 3.2 oz (90.4 kg)   Pt has lost weight since last visit. He is working on exercise. BP is controlled:  BP Readings from Last 3 Encounters:   03/04/19 120/78   02/12/19 134/88   11/07/17 112/78     BP when working out at home is normal.     Creatinine was elevated at last check - order to repeat in system. Was not hydrated for this test.     LDL was elevated at last check. Pt notes he was not entirely fasting for this test. Will recheck in 6 months. Lab Results   Component Value Date/Time    Cholesterol, total 199 02/12/2019 09:55 AM    HDL Cholesterol 45 02/12/2019 09:55 AM    LDL, calculated 129 (H) 02/12/2019 09:55 AM    VLDL, calculated 25 02/12/2019 09:55 AM    Triglyceride 123 02/12/2019 09:55 AM     Stye R lower eyelid is still bothersome and pt notes he is now out of the Bactroban that he was previously using. Has eye appointment in May, but can't be seen before then. Review of Systems   Respiratory: Negative for shortness of breath.     Cardiovascular: Negative for chest pain and palpitations. Neurological: Negative for dizziness, loss of consciousness and weakness. Physical Exam   Constitutional: He is oriented to person, place, and time. He appears well-developed and well-nourished. No distress. HENT:   Head: Normocephalic and atraumatic. R lower inner eyelid red and inflamed. Small stye noted. Neck: Neck supple. No JVD present. No bruit bilateral carotid arteries. Cardiovascular: Normal rate, regular rhythm and normal heart sounds. Pulmonary/Chest: Effort normal and breath sounds normal. No respiratory distress. Musculoskeletal: He exhibits no edema. Neurological: He is alert and oriented to person, place, and time. Skin: Skin is warm and dry. Psychiatric: He has a normal mood and affect. His behavior is normal. Judgment and thought content normal.   Nursing note and vitals reviewed. Diagnoses and all orders for this visit:    1. Migraine with aura and without status migrainosus, not intractable  Paperwork reviewed. Will write letter. 2. Hordeolum externum of right lower eyelid  -     erythromycin (ILOTYCIN) ophthalmic ointment; Use small amount B eyes every 6 hours  Follow up with Eye doctor    3. Hypercholesteremia  Discussed diet/exercise and options for/importance of losing weight. Congratulated pt on weight loss success and encouraged continued efforts. 4. Elevated serum creatinine  Hydrate before repeat labs.

## 2019-03-06 ENCOUNTER — TELEPHONE (OUTPATIENT)
Dept: INTERNAL MEDICINE CLINIC | Age: 54
End: 2019-03-06

## 2019-04-29 ENCOUNTER — OFFICE VISIT (OUTPATIENT)
Dept: INTERNAL MEDICINE CLINIC | Age: 54
End: 2019-04-29

## 2019-04-29 VITALS
RESPIRATION RATE: 16 BRPM | HEIGHT: 70 IN | HEART RATE: 78 BPM | BODY MASS INDEX: 28.4 KG/M2 | SYSTOLIC BLOOD PRESSURE: 111 MMHG | DIASTOLIC BLOOD PRESSURE: 76 MMHG | WEIGHT: 198.4 LBS | OXYGEN SATURATION: 97 % | TEMPERATURE: 98.6 F

## 2019-04-29 DIAGNOSIS — L03.211 CELLULITIS OF FACE: Primary | ICD-10-CM

## 2019-04-29 DIAGNOSIS — J30.2 SEASONAL ALLERGIC RHINITIS, UNSPECIFIED TRIGGER: ICD-10-CM

## 2019-04-29 NOTE — PROGRESS NOTES
Sandrita Villatoro is a 48 y.o. male  Chief Complaint   Patient presents with    Other     follow up from MRSA in right nasal went to [facility]        Health Maintenance Due   Topic Date Due    Shingrix Vaccine Age 50> (1 of 2) 04/26/2017       HPI  MRSA in R nostril after plucking hair/internal scratching. Usually uses a hair beth, but was traveling and didn't have it with him at the time. [facility] - brings notes. Finished Doxycycline and feeling much better. Still using Mupirocin cream QAM inside nose. Review of Systems   Constitutional: Negative for fever and malaise/fatigue. HENT: Positive for congestion. Negative for ear pain. Eyes: Negative for discharge. Respiratory: Negative for cough, sputum production and shortness of breath. Skin: Negative for rash. Neurological: Negative for headaches. Endo/Heme/Allergies: Positive for environmental allergies. Physical Exam   Constitutional: He is oriented to person, place, and time. He appears well-developed and well-nourished. No distress. HENT:   Head: Normocephalic and atraumatic. Mouth/Throat: Oropharynx is clear and moist.   Nasal mucosa pale, inflamed. Eyes: Conjunctivae are normal.   Neck: Neck supple. Cardiovascular: Normal rate, regular rhythm and normal heart sounds. Pulmonary/Chest: Effort normal and breath sounds normal.   Lymphadenopathy:     He has no cervical adenopathy. Neurological: He is alert and oriented to person, place, and time. Skin: Skin is warm and dry. Nursing note and vitals reviewed. Diagnoses and all orders for this visit:    1. Cellulitis of face  Resolved. Finish Bactroban tube    2. Seasonal allergic rhinitis, unspecified trigger  Continue Zyrtec, Flonase.

## 2019-04-29 NOTE — PROGRESS NOTES
1. Have you been to the ER, urgent care clinic since your last visit? Yes see below    Hospitalized since your last visit? No    2. Have you seen or consulted any other health care providers outside of the [address] [place] [address] since your last visit? Include any pap smears or colon screening.  No   Chief Complaint   Patient presents with    Other     follow up from MRSA in right nasal went to [facility]     Not fasting

## 2020-01-24 ENCOUNTER — OFFICE VISIT (OUTPATIENT)
Dept: INTERNAL MEDICINE CLINIC | Age: 55
End: 2020-01-24

## 2020-01-24 VITALS
TEMPERATURE: 98 F | RESPIRATION RATE: 16 BRPM | HEART RATE: 59 BPM | SYSTOLIC BLOOD PRESSURE: 118 MMHG | OXYGEN SATURATION: 98 % | DIASTOLIC BLOOD PRESSURE: 60 MMHG | BODY MASS INDEX: 29.12 KG/M2 | HEIGHT: 70 IN | WEIGHT: 203.4 LBS

## 2020-01-24 DIAGNOSIS — M10.9 GOUT, UNSPECIFIED CAUSE, UNSPECIFIED CHRONICITY, UNSPECIFIED SITE: ICD-10-CM

## 2020-01-24 DIAGNOSIS — Z00.00 ANNUAL PHYSICAL EXAM: Primary | ICD-10-CM

## 2020-01-24 NOTE — PROGRESS NOTES
1. Have you been to the ER, urgent care clinic since your last visit? Hospitalized since your last visit? No    2. Have you seen or consulted any other health care providers outside of the [address] [place] [address] since your last visit? Include any pap smears or colon screening.  Yes    Chief Complaint   Patient presents with    Complete Physical     Fasting

## 2020-01-24 NOTE — PROGRESS NOTES
Aliyah Rivas is a 48 y.o. male  Chief Complaint   Patient presents with    Complete Physical     Visit Vitals  /60   Pulse (!) 59   Temp 98 °F (36.7 °C) (Oral)   Resp 16   Ht 5' 10\" (1.778 m)   Wt 203 lb 6.4 oz (92.3 kg)   SpO2 98%   BMI 29.18 kg/m²      Health Maintenance Due   Topic Date Due    Shingrix Vaccine Age 49> (1 of 2) 04/26/2017     HPI  CE today    Hx Gout - last flare (mild) L ankle recently with eating steak and drinking beer 2 days in a row. Lab Results   Component Value Date/Time    Uric acid 8.2 02/12/2019 09:55 AM     Prior to that, last flare had been 1 year. Not taking Allopurinol. [place] to start if Uric Acid level is very high, but would like to try to control this with diet. Migraines have been well controlled - IMitrex is helping. Review of Systems   Constitutional: Negative for fever and weight loss. HENT: Negative for congestion, hearing loss and sore throat. Eyes: Negative for blurred vision. Respiratory: Negative for cough and shortness of breath. Cardiovascular: Negative for chest pain, palpitations and leg swelling. Gastrointestinal: Negative for abdominal pain, blood in stool, constipation, diarrhea, heartburn, melena, nausea and vomiting. Genitourinary: Negative for dysuria, frequency, hematuria and urgency. Musculoskeletal: Negative for back pain, joint pain and neck pain. Neurological: Positive for headaches. Negative for dizziness, seizures, loss of consciousness and weakness. Headaches resolve with Imitrex. Endo/Heme/Allergies: Negative for environmental allergies. Psychiatric/Behavioral: Negative for depression and substance abuse. The patient is not nervous/anxious and does not have insomnia. Physical Exam  Vitals signs and nursing note reviewed. Constitutional:       General: He is not in acute distress. Appearance: He is well-developed. HENT:      Head: Normocephalic and atraumatic.       Right Ear: External ear normal.      Left Ear: External ear normal.      Nose: Nose normal.   Eyes:      Conjunctiva/sclera: Conjunctivae normal.   Neck:      Musculoskeletal: Neck supple. Thyroid: No thyromegaly. Vascular: No JVD. Cardiovascular:      Rate and Rhythm: Normal rate and regular rhythm. Heart sounds: Normal heart sounds. Pulmonary:      Effort: Pulmonary effort is normal. No respiratory distress. Breath sounds: Normal breath sounds. Abdominal:      General: Bowel sounds are normal. There is no distension. Palpations: Abdomen is soft. There is no mass. Tenderness: There is no tenderness. There is no guarding or rebound. Lymphadenopathy:      Cervical: No cervical adenopathy. Skin:     General: Skin is warm and dry. Neurological:      Mental Status: He is alert and oriented to person, place, and time. Psychiatric:         Behavior: Behavior normal.         Thought Content: Thought content normal.         Judgment: Judgment normal.         Diagnoses and all orders for this visit:    1. Annual physical exam  -     METABOLIC PANEL, COMPREHENSIVE; Future  -     URIC ACID; Future  -     TSH 3RD GENERATION; Future  -     CBC WITH AUTOMATED DIFF; Future  -     LIPID PANEL; Future  -     PSA SCREENING (SCREENING); Future    2. Gout, unspecified cause, unspecified chronicity, unspecified site  Labs as above    Discussed diet/exercise and options for/importance of losing weight.

## 2020-01-25 DIAGNOSIS — Z00.00 ANNUAL PHYSICAL EXAM: ICD-10-CM

## 2020-01-25 LAB
ALBUMIN SERPL-MCNC: 4.6 G/DL (ref 3.8–4.9)
ALBUMIN/GLOB SERPL: 1.9 {RATIO} (ref 1.2–2.2)
ALP SERPL-CCNC: 60 IU/L (ref 39–117)
ALT SERPL-CCNC: 19 IU/L (ref 0–44)
AST SERPL-CCNC: 18 IU/L (ref 0–40)
BASOPHILS # BLD AUTO: 0.1 X10E3/UL (ref 0–0.2)
BASOPHILS NFR BLD AUTO: 1 %
BILIRUB SERPL-MCNC: 0.9 MG/DL (ref 0–1.2)
BUN SERPL-MCNC: 13 MG/DL (ref 6–24)
BUN/CREAT SERPL: 9 (ref 9–20)
CALCIUM SERPL-MCNC: 9.5 MG/DL (ref 8.7–10.2)
CHLORIDE SERPL-SCNC: 100 MMOL/L (ref 96–106)
CHOLEST SERPL-MCNC: 193 MG/DL (ref 100–199)
CO2 SERPL-SCNC: 24 MMOL/L (ref 20–29)
CREAT SERPL-MCNC: 1.42 MG/DL (ref 0.76–1.27)
EOSINOPHIL # BLD AUTO: 0.2 X10E3/UL (ref 0–0.4)
EOSINOPHIL NFR BLD AUTO: 3 %
ERYTHROCYTE [DISTWIDTH] IN BLOOD BY AUTOMATED COUNT: 13.2 % (ref 11.6–15.4)
GLOBULIN SER CALC-MCNC: 2.4 G/DL (ref 1.5–4.5)
GLUCOSE SERPL-MCNC: 91 MG/DL (ref 65–99)
HCT VFR BLD AUTO: 41.3 % (ref 37.5–51)
HDLC SERPL-MCNC: 44 MG/DL
HGB BLD-MCNC: 14.4 G/DL (ref 13–17.7)
IMM GRANULOCYTES # BLD AUTO: 0 X10E3/UL (ref 0–0.1)
IMM GRANULOCYTES NFR BLD AUTO: 0 %
INTERPRETATION, 910389: NORMAL
INTERPRETATION: NORMAL
LDLC SERPL CALC-MCNC: 128 MG/DL (ref 0–99)
LYMPHOCYTES # BLD AUTO: 2 X10E3/UL (ref 0.7–3.1)
LYMPHOCYTES NFR BLD AUTO: 34 %
MCH RBC QN AUTO: 30.1 PG (ref 26.6–33)
MCHC RBC AUTO-ENTMCNC: 34.9 G/DL (ref 31.5–35.7)
MCV RBC AUTO: 86 FL (ref 79–97)
MONOCYTES # BLD AUTO: 0.7 X10E3/UL (ref 0.1–0.9)
MONOCYTES NFR BLD AUTO: 11 %
NEUTROPHILS # BLD AUTO: 3 X10E3/UL (ref 1.4–7)
NEUTROPHILS NFR BLD AUTO: 51 %
PDF IMAGE, 910387: NORMAL
PLATELET # BLD AUTO: 229 X10E3/UL (ref 150–450)
POTASSIUM SERPL-SCNC: 4.2 MMOL/L (ref 3.5–5.2)
PROT SERPL-MCNC: 7 G/DL (ref 6–8.5)
PSA SERPL-MCNC: 0.7 NG/ML (ref 0–4)
RBC # BLD AUTO: 4.78 X10E6/UL (ref 4.14–5.8)
SODIUM SERPL-SCNC: 142 MMOL/L (ref 134–144)
TRIGL SERPL-MCNC: 106 MG/DL (ref 0–149)
TSH SERPL DL<=0.005 MIU/L-ACNC: 2.47 UIU/ML (ref 0.45–4.5)
URATE SERPL-MCNC: 8.8 MG/DL (ref 3.7–8.6)
VLDLC SERPL CALC-MCNC: 21 MG/DL (ref 5–40)
WBC # BLD AUTO: 6 X10E3/UL (ref 3.4–10.8)

## 2020-01-30 ENCOUNTER — TELEPHONE (OUTPATIENT)
Dept: INTERNAL MEDICINE CLINIC | Age: 55
End: 2020-01-30

## 2020-01-30 DIAGNOSIS — M10.9 GOUT, UNSPECIFIED CAUSE, UNSPECIFIED CHRONICITY, UNSPECIFIED SITE: ICD-10-CM

## 2020-01-30 DIAGNOSIS — R79.89 ELEVATED SERUM CREATININE: Primary | ICD-10-CM

## 2020-01-30 NOTE — TELEPHONE ENCOUNTER
Please call pt - his creatinine was elevated again - and this time was higher than last time. This could mean that he has chronic kidney disease. Was he well hydrated at the time of the test? If not, please let me know and I'll order a repeat test for him to do when he is well hydrated. If so - I'd like him to see the kidney specialist.     Also - his uric acid is high. Please check on how much allopurinol he's currently taking. I'd like him to take at least 150 mg/day - if he's already doing that, he can take up to 300 mg/day.

## 2020-01-31 DIAGNOSIS — R79.89 ELEVATED SERUM CREATININE: ICD-10-CM

## 2020-01-31 RX ORDER — ALLOPURINOL 100 MG/1
100 TABLET ORAL DAILY
Qty: 90 TAB | Refills: 3 | Status: SHIPPED | OUTPATIENT
Start: 2020-01-31

## 2020-01-31 NOTE — TELEPHONE ENCOUNTER
Repeat creatinine ordered. Do when hydrated. Ordered 100 mg Allopurinol for pt to take once daily. Please inform.

## 2020-01-31 NOTE — TELEPHONE ENCOUNTER
Spoke with patient states that he is unsure if he was well hydrated.  Patient also states that he has not been taking allopurinol and need a new script

## 2020-02-04 ENCOUNTER — HOSPITAL ENCOUNTER (OUTPATIENT)
Dept: LAB | Age: 55
Discharge: HOME | End: 2020-02-04

## 2020-02-05 PROBLEM — N18.30 CKD (CHRONIC KIDNEY DISEASE), STAGE III (HCC): Status: ACTIVE | Noted: 2020-02-05

## 2020-02-05 LAB — CREAT SERPL-MCNC: 1.36 MG/DL (ref 0.7–1.3)

## 2020-02-05 NOTE — PROGRESS NOTES
Creatinine is still high, but better with hydration. Be sure to hydrate before all future lab draws. Your kidneys are slightly impaired (CKD stage 3), but this just requires monitoring every 6 months. Be sure to hydrate until your urine is clear at least once daily every day, exercise, and work on losing weight.

## 2022-03-19 PROBLEM — Z87.828 HISTORY OF EYE TRAUMA: Status: ACTIVE | Noted: 2017-02-02

## 2022-03-19 PROBLEM — G47.33 OBSTRUCTIVE SLEEP APNEA SYNDROME: Status: ACTIVE | Noted: 2017-01-06

## 2022-03-19 PROBLEM — G43.109 MIGRAINE WITH AURA AND WITHOUT STATUS MIGRAINOSUS, NOT INTRACTABLE: Status: ACTIVE | Noted: 2017-01-06

## 2022-03-19 PROBLEM — H02.401 PTOSIS, RIGHT EYELID: Status: ACTIVE | Noted: 2017-01-06

## 2022-03-19 PROBLEM — E78.00 HYPERCHOLESTEREMIA: Status: ACTIVE | Noted: 2017-11-09

## 2022-03-19 PROBLEM — M10.9 GOUT, UNSPECIFIED CAUSE, UNSPECIFIED CHRONICITY, UNSPECIFIED SITE: Status: ACTIVE | Noted: 2020-01-24

## 2022-03-19 PROBLEM — H40.10X0 OPEN-ANGLE GLAUCOMA: Status: ACTIVE | Noted: 2017-03-03

## 2022-03-19 PROBLEM — J30.2 SEASONAL ALLERGIC RHINITIS: Status: ACTIVE | Noted: 2017-01-06

## 2022-03-20 PROBLEM — N18.30 CKD (CHRONIC KIDNEY DISEASE), STAGE III (HCC): Status: ACTIVE | Noted: 2020-02-05

## 2023-05-24 RX ORDER — ALLOPURINOL 100 MG/1
100 TABLET ORAL DAILY
COMMUNITY
Start: 2020-01-31

## 2023-05-24 RX ORDER — SUMATRIPTAN 100 MG/1
TABLET, FILM COATED ORAL
COMMUNITY
Start: 2019-03-04

## 2023-05-24 RX ORDER — CETIRIZINE HYDROCHLORIDE 10 MG/1
TABLET ORAL
COMMUNITY

## 2023-05-24 RX ORDER — FLUTICASONE PROPIONATE 50 MCG
2 SPRAY, SUSPENSION (ML) NASAL DAILY
COMMUNITY

## 2023-05-24 RX ORDER — ASPIRIN 81 MG/1
81 TABLET, CHEWABLE ORAL DAILY
COMMUNITY